# Patient Record
Sex: FEMALE | Race: WHITE
[De-identification: names, ages, dates, MRNs, and addresses within clinical notes are randomized per-mention and may not be internally consistent; named-entity substitution may affect disease eponyms.]

---

## 2018-08-25 ENCOUNTER — HOSPITAL ENCOUNTER (EMERGENCY)
Dept: HOSPITAL 74 - JER | Age: 63
Discharge: HOME | End: 2018-08-25
Payer: COMMERCIAL

## 2018-08-25 VITALS — HEART RATE: 78 BPM | DIASTOLIC BLOOD PRESSURE: 71 MMHG | SYSTOLIC BLOOD PRESSURE: 128 MMHG

## 2018-08-25 VITALS — TEMPERATURE: 98.8 F

## 2018-08-25 VITALS — BODY MASS INDEX: 66.7 KG/M2

## 2018-08-25 DIAGNOSIS — K76.0: ICD-10-CM

## 2018-08-25 DIAGNOSIS — E78.5: ICD-10-CM

## 2018-08-25 DIAGNOSIS — I10: ICD-10-CM

## 2018-08-25 DIAGNOSIS — N20.1: Primary | ICD-10-CM

## 2018-08-25 LAB
ALBUMIN SERPL-MCNC: 3.9 G/DL (ref 3.4–5)
ALP SERPL-CCNC: 76 U/L (ref 45–117)
ALT SERPL-CCNC: 50 U/L (ref 12–78)
ANION GAP SERPL CALC-SCNC: 10 MMOL/L (ref 8–16)
APPEARANCE UR: CLEAR
AST SERPL-CCNC: 74 U/L (ref 15–37)
BASOPHILS # BLD: 0.3 % (ref 0–2)
BILIRUB SERPL-MCNC: 0.5 MG/DL (ref 0.2–1)
BILIRUB UR STRIP.AUTO-MCNC: NEGATIVE MG/DL
BUN SERPL-MCNC: 18 MG/DL (ref 7–18)
CALCIUM SERPL-MCNC: 9.2 MG/DL (ref 8.5–10.1)
CHLORIDE SERPL-SCNC: 98 MMOL/L (ref 98–107)
CO2 SERPL-SCNC: 28 MMOL/L (ref 21–32)
COLOR UR: (no result)
CREAT SERPL-MCNC: 1.2 MG/DL (ref 0.55–1.02)
DEPRECATED RDW RBC AUTO: 13.7 % (ref 11.6–15.6)
EOSINOPHIL # BLD: 0 % (ref 0–4.5)
EPITH CASTS URNS QL MICRO: (no result) /HPF
GLUCOSE SERPL-MCNC: 143 MG/DL (ref 74–106)
HCT VFR BLD CALC: 38.5 % (ref 32.4–45.2)
HGB BLD-MCNC: 13.2 GM/DL (ref 10.7–15.3)
KETONES UR QL STRIP: NEGATIVE
LEUKOCYTE ESTERASE UR QL STRIP.AUTO: NEGATIVE
LIPASE SERPL-CCNC: 80 U/L (ref 73–393)
LYMPHOCYTES # BLD: 10.3 % (ref 8–40)
MCH RBC QN AUTO: 30.3 PG (ref 25.7–33.7)
MCHC RBC AUTO-ENTMCNC: 34.2 G/DL (ref 32–36)
MCV RBC: 88.6 FL (ref 80–96)
MONOCYTES # BLD AUTO: 6.8 % (ref 3.8–10.2)
NEUTROPHILS # BLD: 82.6 % (ref 42.8–82.8)
NITRITE UR QL STRIP: NEGATIVE
PH UR: 5 [PH] (ref 5–8)
PLATELET # BLD AUTO: 170 K/MM3 (ref 134–434)
PMV BLD: 9.9 FL (ref 7.5–11.1)
POTASSIUM SERPLBLD-SCNC: 5.1 MMOL/L (ref 3.5–5.1)
PROT SERPL-MCNC: 9.3 G/DL (ref 6.4–8.2)
PROT UR QL STRIP: (no result)
PROT UR QL STRIP: NEGATIVE
RBC # BLD AUTO: 4.35 M/MM3 (ref 3.6–5.2)
SODIUM SERPL-SCNC: 136 MMOL/L (ref 136–145)
SP GR UR: 1.03 (ref 1–1.03)
UROBILINOGEN UR STRIP-MCNC: NEGATIVE MG/DL (ref 0.2–1)
WBC # BLD AUTO: 15.8 K/MM3 (ref 4–10)

## 2018-08-25 PROCEDURE — 3E0337Z INTRODUCTION OF ELECTROLYTIC AND WATER BALANCE SUBSTANCE INTO PERIPHERAL VEIN, PERCUTANEOUS APPROACH: ICD-10-PCS | Performed by: EMERGENCY MEDICINE

## 2018-08-25 PROCEDURE — 3E0333Z INTRODUCTION OF ANTI-INFLAMMATORY INTO PERIPHERAL VEIN, PERCUTANEOUS APPROACH: ICD-10-PCS | Performed by: EMERGENCY MEDICINE

## 2018-08-25 PROCEDURE — 3E033NZ INTRODUCTION OF ANALGESICS, HYPNOTICS, SEDATIVES INTO PERIPHERAL VEIN, PERCUTANEOUS APPROACH: ICD-10-PCS | Performed by: EMERGENCY MEDICINE

## 2018-08-25 PROCEDURE — 3E033GC INTRODUCTION OF OTHER THERAPEUTIC SUBSTANCE INTO PERIPHERAL VEIN, PERCUTANEOUS APPROACH: ICD-10-PCS | Performed by: EMERGENCY MEDICINE

## 2018-08-25 NOTE — PDOC
History of Present Illness





- General


Chief Complaint: Nausea/Vomiting


Stated Complaint: VOMITING


Time Seen by Provider: 08/25/18 13:08


History Source: Patient





- History of Present Illness


Initial Comments: 





08/25/18 13:24


Pt is a 63yo F with PMH of HTN, fatty liver, HLD presenting to ED because "I 

was at the [facility] and when I got home I had this bad pain and I 

threw up and I feel like I have to go to the bathroom but nothing comes out". 

Pt said around 2:30am she had an episode of NBNB emesis, took pepto-bismol and 

then had 2 more episodes of emesis at 10am. She has been able to keep liquids 

down, but has not tried any solids. Pt sas the pain is in the LLQ, feels like 

"gas pain". Rates it 5-6/10, but was worse earlier. Does not radiate. Vomiting 

helped reduce the pain. She has not had a bowel movement since yesterday and 

has not passed gas. She admits to some urinary urgency, but no dysuria. 


She denies fever, chills, chest pain, SOB, dizziness, numbness, tingling, 

diarrhea. 





PMH: see hpi


PSH: liver biopsy, tonsillectomy


Meds: see med rec


Allergies: amox


Social: denies





Past History





- Past Medical History


Allergies/Adverse Reactions: 


 Allergies











Allergy/AdvReac Type Severity Reaction Status Date / Time


 


amoxicillin [Amoxicillin] Allergy   Verified 08/25/18 12:47











Home Medications: 


Ambulatory Orders





Amlodipine Besylate [Norvasc -] 5 mg PO DAILY 11/30/12 


Aspirin Coated [Ecotrin -] 81 mg PO DAILY 11/30/12 


Cholecalciferol (Vitamin D3) [Vitamin D3] 1,000 unit PO DAILY 11/30/12 


Fish Oil/Fat No.8/Hrb Comb.137 [Omega 3-6-9 1,200 mg Softgel] 1 tab PO DAILY 11/ 30/12 


Rosuvastatin Calcium [Crestor] 10 mg PO HS 11/30/12 


Olmesartan/Hydrochlorothiazide [Benicar Hct 40-12.5 mg Tablet] 1 each PO DAILY 

05/03/13 


Calcium Carbonate/Vitamin D3 [Calcium + Vitamin D Tablet] 1,200 mg PO DAILY 02/ 11/14 


Melatonin 5 mg PO HS 02/11/14 


Vitamin E 800 unit PO DAILY 02/11/14 


Ibuprofen 800 mg PO BID #6 tablet 08/25/18 








Anemia: No


Asthma: No


Cancer: No


Cardiac Disorders: No


CVA: No


COPD: No


CHF: No


Dementia: No


Diabetes: No


GI Disorders: No


 Disorders: No


HTN: Yes


Hypercholesterolemia: Yes


Liver Disease: No


Seizures: No


Thyroid Disease: No


Other medical history: fatty liver





- Immunization History


Td Vaccination: Yes


Immunization Up to Date: Yes





- Suicide/Smoking/Psychosocial Hx


Smoking Status: No


Smoking History: Never smoked


Years of Tobacco Use: 0


Number of Cigarettes Smoked Daily: 0


Cigars Per Day: 0


Hx Alcohol Use: No


Drug/Substance Use Hx: Yes


Substance Use Type: None


Hx Substance Use Treatment: No





**Review of Systems





- Review of Systems


Constitutional: Yes: Weakness.  No: Chills, Fever


HEENTM: No: Recent change in vision, Double Vision, Throat Pain


Respiratory: No: Cough, Shortness of Breath


Cardiac (ROS): No: Chest Pain, Lightheadedness, Palpitations


ABD/GI: Yes: Nausea, Vomiting, Abdominal cramping (LLQ).  No: Abdominal 

Distended, Blood Streaked Bowels, Constipated, Diarrhea


: Yes: Urgency.  No: Burning, Dysuria


Musculoskeletal: No: Back Pain, Muscle Pain


Neurological: No: Headache, Numbness, Paresthesia, Weakness





*Physical Exam





- Vital Signs


 Last Vital Signs











Temp Pulse Resp BP Pulse Ox


 


 98.8 F   99 H  16   147/82   98 


 


 08/25/18 12:47  08/25/18 12:47  08/25/18 12:47  08/25/18 12:47  08/25/18 12:47














- Physical Exam


General Appearance: Yes: Nourished, Appropriately Dressed.  No: Apparent 

Distress


HEENT: positive: EOMI, KRISTYN.  negative: Pale Conjunctivae, Scleral Icterus (R), 

Scleral Icterus (L), Pharyngeal Erythema


Neck: positive: Trachea midline, Supple.  negative: Lymphadenopathy (R), 

Lymphadenopathy (L)


Respiratory/Chest: positive: Lungs Clear, Normal Breath Sounds.  negative: 

Crackles, Rales, Rhonchi, Wheezing


Cardiovascular: positive: Regular Rhythm, Regular Rate, S1, S2, Murmur.  

negative: Edema, JVD


Vascular Pulses: Dorsalis-Pedis (R): 2+, Doralis-Pedis (L): 2+


Gastrointestinal/Abdominal: positive: Normal Bowel Sounds, Soft, Tenderness (

LLQ tenderness).  negative: Protuberent, Distended, Guarding, Rebound


Musculoskeletal: negative: CVA Tenderness


Extremity: positive: Normal Capillary Refill


Integumentary: positive: Normal Color, Dry, Warm


Neurologic: positive: CNs II-XII NML intact, Fully Oriented, Alert, Normal Mood/

Affect, Normal Response, Motor Strength 5/5





ED Treatment Course





- LABORATORY


CBC & Chemistry Diagram: 


 08/25/18 14:05





 08/25/18 14:05





Medical Decision Making





- Medical Decision Making





08/25/18 13:29


Pt is a 63yo F with PMH of HTN, fatty liver, HLD presenting to ED because "I 

was at the [facility] and when I got home I had this bad pain and I 

threw up and I feel like I have to go to the bathroom but nothing comes out". 

LLQ pain





DDx: diverticulitis, colitis, viral GE, AAA, stone





CBC, CMP, lipase ordered. Will do CT abdomen because of LLQ tenderness. 


Pt given IV tylenol, pepcid, zofran for symptom management. 





08/25/18 16:28


 CT showed 5mm stone either obstructing at UVJ or recently passed. 


Awaiting UA. Gave pt Toradol for pain.


08/25/18 16:44





Pt reported resolution of symptoms. Pt hemodynamically stable. Pt agrees to 

plan to dc home. Will follow up with urology











*DC/Admit/Observation/Transfer


Diagnosis at time of Disposition: 


 Left ureteral stone








- Discharge Dispostion


Disposition: HOME


Condition at time of disposition: Improved


Decision to Admit order: No





- Prescriptions


Prescriptions: 


Ibuprofen 800 mg PO BID #6 tablet





- Referrals


Referrals: 


Noah Gonzalez MD [Primary Care Provider] - 





- Patient Instructions


Printed Discharge Instructions:  [facility] for Kidney Stones


Additional Instructions: 


You were seen here today because you were having abdominal pain and vomiting. 


We did lab tests and a CT scan. The CT scan showed a 5mm stone in your left 

ureter that may have passed into your bladder. 


Your urine test did not show an infection





Please follow up with Urology. You can call [phone] to make an 

appointment. 





Please stay hydrated! I gave you ibuprofen 800mg that you can take for pain.





Please come back to the ED if: the pain gets worse, you are unable to produce 

urine, if you develop fever, if you notice change in your urine or if any new 

concerning symptom develops. 





Thank you





- Post Discharge Activity


Forms/Work/School Notes:  Back to Work

## 2018-08-25 NOTE — PDOC
Attending Attestation





- HPI


HPI: 





08/25/18 16:20


62 year old female with a past medical history of fatty liver, HTN, and HLD who 

presents to the emergency department for evaluation of abdominal pain with 

nausea and vomiting. Pt reports LLQ pain ranked 6/10 in severity. She reports 

nausea with 4 episodes of vomiting today, described as the deep vegetarian food

 she ate yesterday. Pt endorses bowel urgency and notes 2 bowel movements 

described as soft and non watery. 





The patient denies paresthesia of extremities, chest pain, SOB, dizziness, fever

, chills, diarrhea, dysuria, hematuria, and burning upon urination.  





Allergies: amoxicillin 








<Karely Chandler - Last Filed: 08/25/18 16:20>





- Resident


Resident Name: Kylah Smith





- ED Attending Attestation


I have performed the following: I have examined & evaluated the patient, The 

case was reviewed & discussed with the resident, I agree w/resident's findings 

& plan, Exceptions are as noted





- Physicial Exam


PE: 





08/25/18 16:15


awake alert lungs clear bilaterally. heart rrr no mrg.abd soft. mild llq ttp. 

no rebound no guarding.


skin warm and dryl. alert oriented x 3. 





- Medical Decision Making





08/25/18 16:16


differential diagnosis: diverticlitis, GE, food poisoning. uti pyelo. plan ct a/

p r/o diverticulitis.  ua labs pain control 


08/25/18 18:10


pt with uvj stone on ct.  incidentally developed hematuria in ed.  now feeling 

improved. will dc home with followup with urology. 





<Mabel Tiwari - Last Filed: 08/25/18 18:10>





Attestations





- Attestations





Documentation prepared by Karely Chandler, acting as medical scribe for Mabel Tiwari MD.





<Karely Chandler - Last Filed: 08/25/18 16:20>

## 2019-10-28 ENCOUNTER — HOSPITAL ENCOUNTER (OUTPATIENT)
Dept: HOSPITAL 74 - JASU-ENDO | Age: 64
Discharge: HOME | End: 2019-10-28
Attending: INTERNAL MEDICINE
Payer: COMMERCIAL

## 2019-10-28 VITALS — SYSTOLIC BLOOD PRESSURE: 131 MMHG | TEMPERATURE: 98.1 F | DIASTOLIC BLOOD PRESSURE: 71 MMHG | HEART RATE: 66 BPM

## 2019-10-28 VITALS — BODY MASS INDEX: 30.9 KG/M2

## 2019-10-28 DIAGNOSIS — Z53.8: ICD-10-CM

## 2019-10-28 DIAGNOSIS — I48.91: Primary | ICD-10-CM

## 2019-10-28 LAB
ALBUMIN SERPL-MCNC: 3.7 G/DL (ref 3.4–5)
ALP SERPL-CCNC: 73 U/L (ref 45–117)
ALT SERPL-CCNC: 47 U/L (ref 13–61)
ANION GAP SERPL CALC-SCNC: 8 MMOL/L (ref 8–16)
APTT BLD: 41.4 SECONDS (ref 25.2–36.5)
AST SERPL-CCNC: 32 U/L (ref 15–37)
BILIRUB SERPL-MCNC: 0.4 MG/DL (ref 0.2–1)
BUN SERPL-MCNC: 10.7 MG/DL (ref 7–18)
CALCIUM SERPL-MCNC: 9.2 MG/DL (ref 8.5–10.1)
CHLORIDE SERPL-SCNC: 100 MMOL/L (ref 98–107)
CO2 SERPL-SCNC: 30 MMOL/L (ref 21–32)
CREAT SERPL-MCNC: 0.7 MG/DL (ref 0.55–1.3)
DEPRECATED RDW RBC AUTO: 13.9 % (ref 11.6–15.6)
GLUCOSE SERPL-MCNC: 99 MG/DL (ref 74–106)
HCT VFR BLD CALC: 38.4 % (ref 32.4–45.2)
HGB BLD-MCNC: 12.8 GM/DL (ref 10.7–15.3)
INR BLD: 1.46 (ref 0.83–1.09)
MCH RBC QN AUTO: 29.8 PG (ref 25.7–33.7)
MCHC RBC AUTO-ENTMCNC: 33.5 G/DL (ref 32–36)
MCV RBC: 89 FL (ref 80–96)
PLATELET # BLD AUTO: 239 K/MM3 (ref 134–434)
PMV BLD: 8.9 FL (ref 7.5–11.1)
POTASSIUM SERPLBLD-SCNC: 3.8 MMOL/L (ref 3.5–5.1)
PROT SERPL-MCNC: 8.1 G/DL (ref 6.4–8.2)
PT PNL PPP: 17.3 SEC (ref 9.7–13)
RBC # BLD AUTO: 4.31 M/MM3 (ref 3.6–5.2)
SODIUM SERPL-SCNC: 137 MMOL/L (ref 136–145)
WBC # BLD AUTO: 9.5 K/MM3 (ref 4–10)

## 2019-10-28 PROCEDURE — 5A2204Z RESTORATION OF CARDIAC RHYTHM, SINGLE: ICD-10-PCS | Performed by: INTERNAL MEDICINE

## 2019-10-28 NOTE — EKG
Test Reason : 

Blood Pressure : ***/*** mmHG

Vent. Rate : 065 BPM     Atrial Rate : 065 BPM

   P-R Int : 212 ms          QRS Dur : 090 ms

    QT Int : 444 ms       P-R-T Axes : 026 -27 -06 degrees

   QTc Int : 461 ms

 

SINUS RHYTHM WITH 1ST DEGREE A-V BLOCK

OTHERWISE NORMAL ECG

WHEN COMPARED WITH ECG OF 11-FEB-2014 11:11,

T WAVE VARIATION

Confirmed by MERLIN CHAVIS, BEN ([id number]) on 10/28/2019 3:43:55 PM

 

Referred By: Ben Boyer           Confirmed By:BEN BOYER MD

## 2021-02-20 ENCOUNTER — HOSPITAL ENCOUNTER (EMERGENCY)
Dept: HOSPITAL 74 - JVIRT | Age: 66
Discharge: HOME | End: 2021-02-20
Payer: COMMERCIAL

## 2021-02-20 DIAGNOSIS — U07.1: Primary | ICD-10-CM

## 2021-02-20 PROCEDURE — C9803 HOPD COVID-19 SPEC COLLECT: HCPCS

## 2021-02-20 PROCEDURE — U0003 INFECTIOUS AGENT DETECTION BY NUCLEIC ACID (DNA OR RNA); SEVERE ACUTE RESPIRATORY SYNDROME CORONAVIRUS 2 (SARS-COV-2) (CORONAVIRUS DISEASE [COVID-19]), AMPLIFIED PROBE TECHNIQUE, MAKING USE OF HIGH THROUGHPUT TECHNOLOGIES AS DESCRIBED BY CMS-2020-01-R: HCPCS

## 2021-11-26 PROBLEM — Z00.00 ENCOUNTER FOR PREVENTIVE HEALTH EXAMINATION: Status: ACTIVE | Noted: 2021-11-26

## 2021-12-14 ENCOUNTER — APPOINTMENT (OUTPATIENT)
Dept: INTERNAL MEDICINE | Facility: CLINIC | Age: 66
End: 2021-12-14
Payer: MEDICARE

## 2021-12-14 VITALS
DIASTOLIC BLOOD PRESSURE: 64 MMHG | HEIGHT: 63 IN | WEIGHT: 168 LBS | TEMPERATURE: 99.2 F | SYSTOLIC BLOOD PRESSURE: 146 MMHG | BODY MASS INDEX: 29.77 KG/M2 | HEART RATE: 64 BPM | OXYGEN SATURATION: 98 %

## 2021-12-14 VITALS — DIASTOLIC BLOOD PRESSURE: 60 MMHG | SYSTOLIC BLOOD PRESSURE: 120 MMHG

## 2021-12-14 DIAGNOSIS — M16.11 UNILATERAL PRIMARY OSTEOARTHRITIS, RIGHT HIP: ICD-10-CM

## 2021-12-14 DIAGNOSIS — Z78.9 OTHER SPECIFIED HEALTH STATUS: ICD-10-CM

## 2021-12-14 DIAGNOSIS — I48.0 PAROXYSMAL ATRIAL FIBRILLATION: ICD-10-CM

## 2021-12-14 DIAGNOSIS — I10 ESSENTIAL (PRIMARY) HYPERTENSION: ICD-10-CM

## 2021-12-14 DIAGNOSIS — E78.5 HYPERLIPIDEMIA, UNSPECIFIED: ICD-10-CM

## 2021-12-14 DIAGNOSIS — R73.03 PREDIABETES.: ICD-10-CM

## 2021-12-14 DIAGNOSIS — Z01.818 ENCOUNTER FOR OTHER PREPROCEDURAL EXAMINATION: ICD-10-CM

## 2021-12-14 PROCEDURE — 99203 OFFICE O/P NEW LOW 30 MIN: CPT

## 2021-12-14 RX ORDER — AMLODIPINE BESYLATE 10 MG/1
10 TABLET ORAL
Qty: 90 | Refills: 3 | Status: ACTIVE | COMMUNITY
Start: 2021-12-14

## 2021-12-14 RX ORDER — APIXABAN 5 MG/1
5 TABLET, FILM COATED ORAL
Qty: 60 | Refills: 4 | Status: ACTIVE | COMMUNITY
Start: 2021-12-14

## 2021-12-14 RX ORDER — DRONEDARONE 400 MG/1
400 TABLET, FILM COATED ORAL TWICE DAILY
Refills: 0 | Status: ACTIVE | COMMUNITY
Start: 2021-12-14

## 2021-12-14 NOTE — REVIEW OF SYSTEMS
[Joint Pain] : joint pain [Easy Bleeding] : no easy bleeding [Easy Bruising] : easy bruising [Negative] : Heme/Lymph [FreeTextEntry9] : R hip pain see HPI

## 2021-12-14 NOTE — PHYSICAL EXAM
[Normal] : normal gait, coordination grossly intact, no focal deficits [de-identified] : R hip c limited and render ROM

## 2021-12-14 NOTE — ASSESSMENT
[Patient Optimized for Surgery] : Patient optimized for surgery [No Further Testing Recommended] : no further testing recommended [As per surgery] : as per surgery [FreeTextEntry4] : Ms. PARK is a 66 year yo female with no h/o CAD and good exercise tolerance. She denies CP, palpitation or SOB and her EKG today is normal. She does take blood thinners and denies sleep apnea or urinary  symptoms. She does not have a h/o DVT. She will continue the prescription medications perioperatively as instructed. The morning of the procedure she will only take the Amlodipine, losartan , Multaq an metoprolol pills.SHe will stop all vitamins and fish oil 1 week prior to procedure and Elliquis 2 days prior to procedure.\par \par Ms. PARK has a moderate risk for perioperative cardiovascular complications and will undergo the procedure as planned. I will follow her postop.\par \par  ***More than 50% of the face to face time was devoted to counseling and/or coordination of care. The discussion and/or coordination of care included: perioperative medication management.\par \par  [FreeTextEntry2] : parenteral DVT prophylaxis recommended till Eliquis could be resumed

## 2021-12-14 NOTE — HISTORY OF PRESENT ILLNESS
[Atrial Fibrillation] : atrial fibrillation [Coronary Artery Disease] : no coronary artery disease [Recent Myocardial Infarction] : no recent myocardial infarction [Implantable Device/Pacemaker] : no implantable device/pacemaker [No Pertinent Pulmonary History] : no history of asthma, COPD, sleep apnea, or smoking [No Adverse Anesthesia Reaction] : no adverse anesthesia reaction in self or family member [Chronic Anticoagulation] : chronic anticoagulation [Chronic Kidney Disease] : no chronic kidney disease [Diabetes] : no diabetes [(Patient denies any chest pain, claudication, dyspnea on exertion, orthopnea, palpitations or syncope)] : Patient denies any chest pain, claudication, dyspnea on exertion, orthopnea, palpitations or syncope [Good (7-10 METs)] : Good (7-10 METs) [FreeTextEntry1] : NALLELY YADAV [FreeTextEntry2] : 12/27 [FreeTextEntry3] : dr Cardona [FreeTextEntry4] : Dear : Thank you for referring Ms. PARK for preoperative evaluation prior to R THR  on 12/27.  She  is complaining of R hip  pain for 1.5 years getting worse in the last 6  months. Anti-inflammatories and physical therapy/injections are not helping any longer.\par \par PCP dr Gilbert\par Cardiology Dr Leo

## 2022-09-14 ENCOUNTER — TRANSCRIPTION ENCOUNTER (OUTPATIENT)
Age: 67
End: 2022-09-14

## 2022-09-19 ENCOUNTER — TRANSCRIPTION ENCOUNTER (OUTPATIENT)
Age: 67
End: 2022-09-19

## 2022-09-24 ENCOUNTER — RESULT REVIEW (OUTPATIENT)
Age: 67
End: 2022-09-24

## 2022-09-30 ENCOUNTER — TRANSCRIPTION ENCOUNTER (OUTPATIENT)
Age: 67
End: 2022-09-30

## 2023-04-25 ENCOUNTER — HOSPITAL ENCOUNTER (OUTPATIENT)
Dept: HOSPITAL 74 - JASU-SURG | Age: 68
Discharge: HOME | End: 2023-04-25
Attending: STUDENT IN AN ORGANIZED HEALTH CARE EDUCATION/TRAINING PROGRAM
Payer: COMMERCIAL

## 2023-04-25 VITALS — DIASTOLIC BLOOD PRESSURE: 50 MMHG | TEMPERATURE: 97 F | SYSTOLIC BLOOD PRESSURE: 130 MMHG | RESPIRATION RATE: 18 BRPM

## 2023-04-25 VITALS — HEART RATE: 72 BPM

## 2023-04-25 VITALS — BODY MASS INDEX: 31.4 KG/M2

## 2023-04-25 DIAGNOSIS — M16.12: Primary | ICD-10-CM

## 2023-04-25 PROCEDURE — 3E0U33Z INTRODUCTION OF ANTI-INFLAMMATORY INTO JOINTS, PERCUTANEOUS APPROACH: ICD-10-PCS | Performed by: STUDENT IN AN ORGANIZED HEALTH CARE EDUCATION/TRAINING PROGRAM

## 2023-04-25 PROCEDURE — 3E0U3BZ INTRODUCTION OF ANESTHETIC AGENT INTO JOINTS, PERCUTANEOUS APPROACH: ICD-10-PCS | Performed by: STUDENT IN AN ORGANIZED HEALTH CARE EDUCATION/TRAINING PROGRAM
